# Patient Record
Sex: MALE | Race: WHITE
[De-identification: names, ages, dates, MRNs, and addresses within clinical notes are randomized per-mention and may not be internally consistent; named-entity substitution may affect disease eponyms.]

---

## 2017-02-02 ENCOUNTER — HOSPITAL ENCOUNTER (OUTPATIENT)
Dept: HOSPITAL 71 - ER | Age: 57
Setting detail: OBSERVATION
LOS: 1 days | Discharge: HOME | End: 2017-02-03
Attending: INTERNAL MEDICINE | Admitting: INTERNAL MEDICINE
Payer: COMMERCIAL

## 2017-02-02 VITALS — RESPIRATION RATE: 20 BRPM | TEMPERATURE: 98.4 F

## 2017-02-02 VITALS
BODY MASS INDEX: 37.49 KG/M2 | WEIGHT: 238.9 LBS | HEIGHT: 67 IN | WEIGHT: 238.9 LBS | HEIGHT: 67 IN | BODY MASS INDEX: 37.49 KG/M2

## 2017-02-02 VITALS — TEMPERATURE: 98.4 F | SYSTOLIC BLOOD PRESSURE: 109 MMHG

## 2017-02-02 VITALS — SYSTOLIC BLOOD PRESSURE: 130 MMHG

## 2017-02-02 VITALS — RESPIRATION RATE: 20 BRPM

## 2017-02-02 DIAGNOSIS — Z79.899: ICD-10-CM

## 2017-02-02 DIAGNOSIS — E83.42: ICD-10-CM

## 2017-02-02 DIAGNOSIS — E86.0: ICD-10-CM

## 2017-02-02 DIAGNOSIS — R07.89: ICD-10-CM

## 2017-02-02 DIAGNOSIS — G47.33: ICD-10-CM

## 2017-02-02 DIAGNOSIS — I10: ICD-10-CM

## 2017-02-02 DIAGNOSIS — R91.1: ICD-10-CM

## 2017-02-02 DIAGNOSIS — K76.0: ICD-10-CM

## 2017-02-02 DIAGNOSIS — Z79.82: ICD-10-CM

## 2017-02-02 DIAGNOSIS — K57.90: ICD-10-CM

## 2017-02-02 DIAGNOSIS — A08.4: Primary | ICD-10-CM

## 2017-02-02 PROCEDURE — 80048 BASIC METABOLIC PNL TOTAL CA: CPT

## 2017-02-02 PROCEDURE — 99217: CPT

## 2017-02-02 PROCEDURE — 94799 UNLISTED PULMONARY SVC/PX: CPT

## 2017-02-02 PROCEDURE — 96361 HYDRATE IV INFUSION ADD-ON: CPT

## 2017-02-02 PROCEDURE — 85730 THROMBOPLASTIN TIME PARTIAL: CPT

## 2017-02-02 PROCEDURE — 96366 THER/PROPH/DIAG IV INF ADDON: CPT

## 2017-02-02 PROCEDURE — 96374 THER/PROPH/DIAG INJ IV PUSH: CPT

## 2017-02-02 PROCEDURE — 83690 ASSAY OF LIPASE: CPT

## 2017-02-02 PROCEDURE — 82550 ASSAY OF CK (CPK): CPT

## 2017-02-02 PROCEDURE — 96365 THER/PROPH/DIAG IV INF INIT: CPT

## 2017-02-02 PROCEDURE — 99219: CPT

## 2017-02-02 PROCEDURE — 84484 ASSAY OF TROPONIN QUANT: CPT

## 2017-02-02 PROCEDURE — 74177 CT ABD & PELVIS W/CONTRAST: CPT

## 2017-02-02 PROCEDURE — 93306 TTE W/DOPPLER COMPLETE: CPT

## 2017-02-02 PROCEDURE — 85610 PROTHROMBIN TIME: CPT

## 2017-02-02 PROCEDURE — 85025 COMPLETE CBC W/AUTO DIFF WBC: CPT

## 2017-02-02 PROCEDURE — 71010: CPT

## 2017-02-02 PROCEDURE — 83605 ASSAY OF LACTIC ACID: CPT

## 2017-02-02 PROCEDURE — 87804 INFLUENZA ASSAY W/OPTIC: CPT

## 2017-02-02 PROCEDURE — 36415 COLL VENOUS BLD VENIPUNCTURE: CPT

## 2017-02-02 PROCEDURE — 93005 ELECTROCARDIOGRAM TRACING: CPT

## 2017-02-02 PROCEDURE — 96376 TX/PRO/DX INJ SAME DRUG ADON: CPT

## 2017-02-02 PROCEDURE — 81003 URINALYSIS AUTO W/O SCOPE: CPT

## 2017-02-02 PROCEDURE — 80053 COMPREHEN METABOLIC PANEL: CPT

## 2017-02-02 PROCEDURE — 96375 TX/PRO/DX INJ NEW DRUG ADDON: CPT

## 2017-02-02 PROCEDURE — 83735 ASSAY OF MAGNESIUM: CPT

## 2017-02-02 RX ADMIN — MAGNESIUM SULFATE IN DEXTROSE SCH MLS/HR: 10 INJECTION, SOLUTION INTRAVENOUS at 22:49

## 2017-02-02 RX ADMIN — Medication SCH ML: at 21:30

## 2017-02-02 RX ADMIN — MAGNESIUM SULFATE IN DEXTROSE SCH MLS/HR: 10 INJECTION, SOLUTION INTRAVENOUS at 21:29

## 2017-02-02 RX ADMIN — SODIUM CHLORIDE SCH MLS/HR: 9 INJECTION, SOLUTION INTRAVENOUS at 21:29

## 2017-02-03 VITALS — RESPIRATION RATE: 20 BRPM | TEMPERATURE: 99.4 F | SYSTOLIC BLOOD PRESSURE: 131 MMHG

## 2017-02-03 VITALS — TEMPERATURE: 98.2 F | SYSTOLIC BLOOD PRESSURE: 133 MMHG | RESPIRATION RATE: 16 BRPM

## 2017-02-03 VITALS — RESPIRATION RATE: 20 BRPM | TEMPERATURE: 99.1 F | SYSTOLIC BLOOD PRESSURE: 131 MMHG

## 2017-02-03 VITALS — SYSTOLIC BLOOD PRESSURE: 131 MMHG | TEMPERATURE: 99.1 F | RESPIRATION RATE: 20 BRPM

## 2017-02-03 VITALS — TEMPERATURE: 98.8 F | SYSTOLIC BLOOD PRESSURE: 136 MMHG | RESPIRATION RATE: 20 BRPM

## 2017-02-03 RX ADMIN — MAGNESIUM SULFATE IN DEXTROSE SCH MLS/HR: 10 INJECTION, SOLUTION INTRAVENOUS at 00:21

## 2017-02-03 RX ADMIN — Medication SCH ML: at 07:40

## 2017-02-03 RX ADMIN — MAGNESIUM SULFATE IN DEXTROSE SCH MLS/HR: 10 INJECTION, SOLUTION INTRAVENOUS at 02:04

## 2017-02-03 RX ADMIN — SODIUM CHLORIDE SCH MLS/HR: 9 INJECTION, SOLUTION INTRAVENOUS at 11:22

## 2018-07-18 ENCOUNTER — CONVERSION ENCOUNTER (OUTPATIENT)
Dept: SURGERY | Facility: CLINIC | Age: 58
End: 2018-07-18

## 2018-07-18 ENCOUNTER — OFFICE VISIT CONVERTED (OUTPATIENT)
Dept: UROLOGY | Facility: CLINIC | Age: 58
End: 2018-07-18
Attending: UROLOGY

## 2018-08-02 ENCOUNTER — OFFICE VISIT (OUTPATIENT)
Dept: FAMILY MEDICINE CLINIC | Facility: CLINIC | Age: 58
End: 2018-08-02

## 2018-08-02 VITALS
BODY MASS INDEX: 34.53 KG/M2 | RESPIRATION RATE: 16 BRPM | TEMPERATURE: 98.3 F | WEIGHT: 220 LBS | HEIGHT: 67 IN | HEART RATE: 75 BPM | SYSTOLIC BLOOD PRESSURE: 150 MMHG | DIASTOLIC BLOOD PRESSURE: 78 MMHG | OXYGEN SATURATION: 96 %

## 2018-08-02 DIAGNOSIS — K57.10 DIVERTICULOSIS OF SMALL INTESTINE WITHOUT HEMORRHAGE: ICD-10-CM

## 2018-08-02 DIAGNOSIS — Z00.00 PHYSICAL EXAM, ANNUAL: Primary | ICD-10-CM

## 2018-08-02 DIAGNOSIS — I10 ESSENTIAL HYPERTENSION: ICD-10-CM

## 2018-08-02 DIAGNOSIS — E11.9 TYPE 2 DIABETES MELLITUS WITHOUT COMPLICATION, WITHOUT LONG-TERM CURRENT USE OF INSULIN (HCC): ICD-10-CM

## 2018-08-02 DIAGNOSIS — K76.0 FATTY LIVER: ICD-10-CM

## 2018-08-02 PROCEDURE — 99386 PREV VISIT NEW AGE 40-64: CPT | Performed by: INTERNAL MEDICINE

## 2018-08-02 RX ORDER — AMLODIPINE BESYLATE 10 MG/1
10 TABLET ORAL DAILY
Qty: 30 TABLET | Refills: 3 | Status: SHIPPED | OUTPATIENT
Start: 2018-08-02 | End: 2018-12-21 | Stop reason: SDUPTHER

## 2018-08-02 RX ORDER — HYDROCHLOROTHIAZIDE 25 MG/1
25 TABLET ORAL DAILY
COMMUNITY
End: 2018-08-02 | Stop reason: ALTCHOICE

## 2018-08-02 RX ORDER — LISINOPRIL 20 MG/1
20 TABLET ORAL DAILY
Qty: 30 TABLET | Refills: 3 | Status: SHIPPED | OUTPATIENT
Start: 2018-08-02 | End: 2018-12-21 | Stop reason: SDUPTHER

## 2018-08-02 RX ORDER — LISINOPRIL 5 MG/1
5 TABLET ORAL
COMMUNITY
End: 2018-08-02 | Stop reason: DRUGHIGH

## 2018-08-02 RX ORDER — ASPIRIN 81 MG/1
81 TABLET ORAL DAILY
COMMUNITY
End: 2022-08-12

## 2018-08-02 RX ORDER — ATORVASTATIN CALCIUM 10 MG/1
10 TABLET, FILM COATED ORAL DAILY
COMMUNITY
End: 2018-12-21 | Stop reason: DRUGHIGH

## 2018-08-02 NOTE — PATIENT INSTRUCTIONS
Diabetes Mellitus and Nutrition  When you have diabetes (diabetes mellitus), it is very important to have healthy eating habits because your blood sugar (glucose) levels are greatly affected by what you eat and drink. Eating healthy foods in the appropriate amounts, at about the same times every day, can help you:  · Control your blood glucose.  · Lower your risk of heart disease.  · Improve your blood pressure.  · Reach or maintain a healthy weight.    Every person with diabetes is different, and each person has different needs for a meal plan. Your health care provider may recommend that you work with a diet and nutrition specialist (dietitian) to make a meal plan that is best for you. Your meal plan may vary depending on factors such as:  · The calories you need.  · The medicines you take.  · Your weight.  · Your blood glucose, blood pressure, and cholesterol levels.  · Your activity level.  · Other health conditions you have, such as heart or kidney disease.    How do carbohydrates affect me?  Carbohydrates affect your blood glucose level more than any other type of food. Eating carbohydrates naturally increases the amount of glucose in your blood. Carbohydrate counting is a method for keeping track of how many carbohydrates you eat. Counting carbohydrates is important to keep your blood glucose at a healthy level, especially if you use insulin or take certain oral diabetes medicines.  It is important to know how many carbohydrates you can safely have in each meal. This is different for every person. Your dietitian can help you calculate how many carbohydrates you should have at each meal and for snack.  Foods that contain carbohydrates include:  · Bread, cereal, rice, pasta, and crackers.  · Potatoes and corn.  · Peas, beans, and lentils.  · Milk and yogurt.  · Fruit and juice.  · Desserts, such as cakes, cookies, ice cream, and candy.    How does alcohol affect me?  Alcohol can cause a sudden decrease in blood  "glucose (hypoglycemia), especially if you use insulin or take certain oral diabetes medicines. Hypoglycemia can be a life-threatening condition. Symptoms of hypoglycemia (sleepiness, dizziness, and confusion) are similar to symptoms of having too much alcohol.  If your health care provider says that alcohol is safe for you, follow these guidelines:  · Limit alcohol intake to no more than 1 drink per day for nonpregnant women and 2 drinks per day for men. One drink equals 12 oz of beer, 5 oz of wine, or 1½ oz of hard liquor.  · Do not drink on an empty stomach.  · Keep yourself hydrated with water, diet soda, or unsweetened iced tea.  · Keep in mind that regular soda, juice, and other mixers may contain a lot of sugar and must be counted as carbohydrates.    What are tips for following this plan?  Reading food labels  · Start by checking the serving size on the label. The amount of calories, carbohydrates, fats, and other nutrients listed on the label are based on one serving of the food. Many foods contain more than one serving per package.  · Check the total grams (g) of carbohydrates in one serving. You can calculate the number of servings of carbohydrates in one serving by dividing the total carbohydrates by 15. For example, if a food has 30 g of total carbohydrates, it would be equal to 2 servings of carbohydrates.  · Check the number of grams (g) of saturated and trans fats in one serving. Choose foods that have low or no amount of these fats.  · Check the number of milligrams (mg) of sodium in one serving. Most people should limit total sodium intake to less than 2,300 mg per day.  · Always check the nutrition information of foods labeled as \"low-fat\" or \"nonfat\". These foods may be higher in added sugar or refined carbohydrates and should be avoided.  · Talk to your dietitian to identify your daily goals for nutrients listed on the label.  Shopping  · Avoid buying canned, premade, or processed foods. These " foods tend to be high in fat, sodium, and added sugar.  · Shop around the outside edge of the grocery store. This includes fresh fruits and vegetables, bulk grains, fresh meats, and fresh dairy.  Cooking  · Use low-heat cooking methods, such as baking, instead of high-heat cooking methods like deep frying.  · Cook using healthy oils, such as olive, canola, or sunflower oil.  · Avoid cooking with butter, cream, or high-fat meats.  Meal planning  · Eat meals and snacks regularly, preferably at the same times every day. Avoid going long periods of time without eating.  · Eat foods high in fiber, such as fresh fruits, vegetables, beans, and whole grains. Talk to your dietitian about how many servings of carbohydrates you can eat at each meal.  · Eat 4-6 ounces of lean protein each day, such as lean meat, chicken, fish, eggs, or tofu. 1 ounce is equal to 1 ounce of meat, chicken, or fish, 1 egg, or 1/4 cup of tofu.  · Eat some foods each day that contain healthy fats, such as avocado, nuts, seeds, and fish.  Lifestyle    · Check your blood glucose regularly.  · Exercise at least 30 minutes 5 or more days each week, or as told by your health care provider.  · Take medicines as told by your health care provider.  · Do not use any products that contain nicotine or tobacco, such as cigarettes and e-cigarettes. If you need help quitting, ask your health care provider.  · Work with a counselor or diabetes educator to identify strategies to manage stress and any emotional and social challenges.  What are some questions to ask my health care provider?  · Do I need to meet with a diabetes educator?  · Do I need to meet with a dietitian?  · What number can I call if I have questions?  · When are the best times to check my blood glucose?  Where to find more information:  · American Diabetes Association: diabetes.org/food-and-fitness/food  · Academy of Nutrition and Dietetics:  www.eatright.org/resources/health/diseases-and-conditions/diabetes  · National [place] of Diabetes and Digestive and Kidney Diseases (NIH): www.niddk.nih.gov/health-information/diabetes/overview/diet-eating-physical-activity  Summary  · A healthy meal plan will help you control your blood glucose and maintain a healthy lifestyle.  · Working with a diet and nutrition specialist (dietitian) can help you make a meal plan that is best for you.  · Keep in mind that carbohydrates and alcohol have immediate effects on your blood glucose levels. It is important to count carbohydrates and to use alcohol carefully.  This information is not intended to replace advice given to you by your health care provider. Make sure you discuss any questions you have with your health care provider.  Document Released: 09/14/2006 Document Revised: 01/22/2018 Document Reviewed: 01/22/2018  [employer] Interactive Patient Education © 2018 [employer]

## 2018-08-02 NOTE — PROGRESS NOTES
Subjective   Forest Freeman is a 57 y.o. male.     History of Present Illness   Patient being seen for a physical.  Her has a history of diabetes and will record his blood sugar and blood pressure return to our clinic in 2 weeks.  He does have a fatty liver and was given vitamin E 200 units daily.  His diverticulosis is been well-controlled with past several months.  Patient's diet and exercise level was discussed and he was given a DASH diet.    Dictated utilizing Dragon dictation. If there are questions or for further clarification, please contact me.   The following portions of the patient's history were reviewed and updated as appropriate: allergies, current medications, past family history, past medical history, past social history, past surgical history and problem list.    Review of Systems   Constitutional: Negative for fatigue and fever.   HENT: Positive for congestion. Negative for trouble swallowing.    Eyes: Negative for discharge and visual disturbance.   Respiratory: Negative for choking and shortness of breath.    Cardiovascular: Negative for chest pain and palpitations.   Gastrointestinal: Negative for abdominal pain and blood in stool.   Endocrine: Negative.    Genitourinary: Negative for genital sores and hematuria.   Musculoskeletal: Negative for gait problem and joint swelling.   Skin: Negative for color change, pallor, rash and wound.   Allergic/Immunologic: Positive for environmental allergies. Negative for immunocompromised state.   Neurological: Negative for facial asymmetry and speech difficulty.   Psychiatric/Behavioral: Negative for hallucinations and suicidal ideas.       Objective   Physical Exam   Constitutional: He is oriented to person, place, and time. He appears well-developed and well-nourished. No distress.   HENT:   Head: Normocephalic and atraumatic.   Right Ear: External ear normal.   Left Ear: External ear normal.   Nose: Nose normal.   Mouth/Throat: Oropharynx is clear and  moist. No oropharyngeal exudate.   Eyes: Pupils are equal, round, and reactive to light. Conjunctivae and EOM are normal. Right eye exhibits no discharge. Left eye exhibits no discharge. No scleral icterus.   Neck: Normal range of motion. Neck supple. No JVD present. No tracheal deviation present. No thyromegaly present.   Cardiovascular: Normal rate, regular rhythm and normal heart sounds.  Exam reveals no gallop and no friction rub.    No murmur heard.  Pulmonary/Chest: Effort normal and breath sounds normal. No stridor. No respiratory distress. He has no wheezes. He has no rales. He exhibits no tenderness.   Abdominal: Soft. Bowel sounds are normal. He exhibits no distension and no mass. There is no tenderness. There is no rebound and no guarding. No hernia.   Musculoskeletal: Normal range of motion. He exhibits no edema, tenderness or deformity.   Lymphadenopathy:     He has no cervical adenopathy.   Neurological: He is alert and oriented to person, place, and time. He displays normal reflexes. No cranial nerve deficit or sensory deficit. He exhibits normal muscle tone. Coordination normal.   Skin: Skin is warm and dry. No rash noted. He is not diaphoretic. No erythema. No pallor.   Psychiatric: He has a normal mood and affect. His behavior is normal. Judgment and thought content normal.   Nursing note and vitals reviewed.      Assessment/Plan   Problems Addressed this Visit        Cardiovascular and Mediastinum    Hypertension    Relevant Medications    lisinopril (PRINIVIL,ZESTRIL) 20 MG tablet    amLODIPine (NORVASC) 10 MG tablet    Other Relevant Orders    CBC & Differential    Comprehensive Metabolic Panel    Hemoglobin A1c    Lipid Panel    Vitamin D 25 Hydroxy    PSA Screen       Digestive    Diverticulosis    Relevant Orders    CBC & Differential    Comprehensive Metabolic Panel    Hemoglobin A1c    Lipid Panel    Vitamin D 25 Hydroxy    PSA Screen    Fatty liver    Relevant Orders    CBC & Differential     Comprehensive Metabolic Panel    Hemoglobin A1c    Lipid Panel    Vitamin D 25 Hydroxy    PSA Screen       Endocrine    Type 2 diabetes mellitus without complication (CMS/HCC)    Relevant Medications    metFORMIN (GLUCOPHAGE) 500 MG tablet    Other Relevant Orders    CBC & Differential    Comprehensive Metabolic Panel    Hemoglobin A1c    Lipid Panel    Vitamin D 25 Hydroxy    PSA Screen      Other Visit Diagnoses     Physical exam, annual    -  Primary

## 2018-12-21 ENCOUNTER — LAB (OUTPATIENT)
Dept: FAMILY MEDICINE CLINIC | Facility: CLINIC | Age: 58
End: 2018-12-21

## 2018-12-21 ENCOUNTER — TELEPHONE (OUTPATIENT)
Dept: FAMILY MEDICINE CLINIC | Facility: CLINIC | Age: 58
End: 2018-12-21

## 2018-12-21 DIAGNOSIS — I10 ESSENTIAL HYPERTENSION: ICD-10-CM

## 2018-12-21 DIAGNOSIS — E11.9 TYPE 2 DIABETES MELLITUS WITHOUT COMPLICATION, WITHOUT LONG-TERM CURRENT USE OF INSULIN (HCC): ICD-10-CM

## 2018-12-21 DIAGNOSIS — E78.2 MIXED HYPERLIPIDEMIA: Primary | ICD-10-CM

## 2018-12-21 DIAGNOSIS — K76.0 FATTY LIVER: ICD-10-CM

## 2018-12-21 DIAGNOSIS — K57.10 DIVERTICULOSIS OF SMALL INTESTINE WITHOUT HEMORRHAGE: ICD-10-CM

## 2018-12-21 LAB
25(OH)D3 SERPL-MCNC: 28.5 NG/ML (ref 30–100)
ALBUMIN SERPL-MCNC: 4.6 G/DL (ref 3.5–5.2)
ALBUMIN/GLOB SERPL: 1.5 G/DL
ALP SERPL-CCNC: 95 U/L (ref 39–117)
ALT SERPL W P-5'-P-CCNC: 16 U/L (ref 1–41)
ANION GAP SERPL CALCULATED.3IONS-SCNC: 11.8 MMOL/L
AST SERPL-CCNC: 14 U/L (ref 1–40)
BILIRUB SERPL-MCNC: 0.8 MG/DL (ref 0.1–1.2)
BUN BLD-MCNC: 15 MG/DL (ref 6–20)
BUN/CREAT SERPL: 12.9 (ref 7–25)
CALCIUM SPEC-SCNC: 9.8 MG/DL (ref 8.6–10.5)
CHLORIDE SERPL-SCNC: 101 MMOL/L (ref 98–107)
CHOLEST SERPL-MCNC: 210 MG/DL (ref 0–200)
CO2 SERPL-SCNC: 27.2 MMOL/L (ref 22–29)
CREAT BLD-MCNC: 1.16 MG/DL (ref 0.76–1.27)
ERYTHROCYTE [DISTWIDTH] IN BLOOD BY AUTOMATED COUNT: 13 % (ref 4.5–15)
GFR SERPL CREATININE-BSD FRML MDRD: 65 ML/MIN/1.73
GLOBULIN UR ELPH-MCNC: 3.1 GM/DL
GLUCOSE BLD-MCNC: 138 MG/DL (ref 65–99)
HBA1C MFR BLD: 5.82 % (ref 4.8–5.6)
HCT VFR BLD AUTO: 49.8 % (ref 35–60)
HDLC SERPL-MCNC: 37 MG/DL (ref 40–60)
HGB BLD-MCNC: 16.5 G/DL (ref 13.5–18)
LDLC SERPL CALC-MCNC: 151 MG/DL (ref 0–100)
LDLC/HDLC SERPL: 4.09 {RATIO}
LYMPHOCYTES # BLD AUTO: 2.3 10*3/MM3 (ref 1.2–3.4)
LYMPHOCYTES NFR BLD AUTO: 20.8 % (ref 21–51)
MCH RBC QN AUTO: 30.1 PG (ref 26.1–33.1)
MCHC RBC AUTO-ENTMCNC: 33.1 G/DL (ref 33–37)
MCV RBC AUTO: 90.9 FL (ref 80–99)
MONOCYTES # BLD AUTO: 0.7 10*3/MM3 (ref 0.1–0.6)
MONOCYTES NFR BLD AUTO: 6.3 % (ref 2–9)
NEUTROPHILS # BLD AUTO: 8 10*3/MM3 (ref 1.4–6.5)
NEUTROPHILS NFR BLD AUTO: 72.9 % (ref 42–75)
PLATELET # BLD AUTO: 309 10*3/MM3 (ref 150–450)
PMV BLD AUTO: 7.7 FL (ref 7.1–10.5)
POTASSIUM BLD-SCNC: 4.6 MMOL/L (ref 3.5–5.2)
PROT SERPL-MCNC: 7.7 G/DL (ref 6–8.5)
PSA SERPL-MCNC: 0.34 NG/ML (ref 0–4)
RBC # BLD AUTO: 5.48 10*6/MM3 (ref 4–6)
SODIUM BLD-SCNC: 140 MMOL/L (ref 136–145)
TRIGL SERPL-MCNC: 109 MG/DL (ref 0–150)
VLDLC SERPL-MCNC: 21.8 MG/DL (ref 5–40)
WBC NRBC COR # BLD: 11 10*3/MM3 (ref 4.5–10)

## 2018-12-21 PROCEDURE — 80053 COMPREHEN METABOLIC PANEL: CPT | Performed by: INTERNAL MEDICINE

## 2018-12-21 PROCEDURE — 85025 COMPLETE CBC W/AUTO DIFF WBC: CPT | Performed by: INTERNAL MEDICINE

## 2018-12-21 PROCEDURE — 82306 VITAMIN D 25 HYDROXY: CPT | Performed by: INTERNAL MEDICINE

## 2018-12-21 PROCEDURE — 36415 COLL VENOUS BLD VENIPUNCTURE: CPT | Performed by: INTERNAL MEDICINE

## 2018-12-21 PROCEDURE — 83036 HEMOGLOBIN GLYCOSYLATED A1C: CPT | Performed by: INTERNAL MEDICINE

## 2018-12-21 PROCEDURE — 80061 LIPID PANEL: CPT | Performed by: INTERNAL MEDICINE

## 2018-12-21 PROCEDURE — G0103 PSA SCREENING: HCPCS | Performed by: INTERNAL MEDICINE

## 2018-12-21 RX ORDER — LISINOPRIL 20 MG/1
20 TABLET ORAL DAILY
Qty: 30 TABLET | Refills: 0 | Status: SHIPPED | OUTPATIENT
Start: 2018-12-21 | End: 2018-12-21 | Stop reason: SDUPTHER

## 2018-12-21 RX ORDER — AMLODIPINE BESYLATE 10 MG/1
10 TABLET ORAL DAILY
Qty: 30 TABLET | Refills: 3 | Status: SHIPPED | OUTPATIENT
Start: 2018-12-21 | End: 2018-12-21 | Stop reason: SDUPTHER

## 2018-12-21 RX ORDER — LISINOPRIL 20 MG/1
20 TABLET ORAL DAILY
Qty: 90 TABLET | Refills: 0 | Status: SHIPPED | OUTPATIENT
Start: 2018-12-21 | End: 2018-12-21 | Stop reason: SDUPTHER

## 2018-12-21 RX ORDER — LISINOPRIL 20 MG/1
20 TABLET ORAL DAILY
Qty: 90 TABLET | Refills: 0 | Status: SHIPPED | OUTPATIENT
Start: 2018-12-21 | End: 2019-01-02 | Stop reason: DRUGHIGH

## 2018-12-21 RX ORDER — AMLODIPINE BESYLATE 10 MG/1
10 TABLET ORAL DAILY
Qty: 90 TABLET | Refills: 0 | Status: SHIPPED | OUTPATIENT
Start: 2018-12-21

## 2018-12-21 RX ORDER — ATORVASTATIN CALCIUM 40 MG/1
40 TABLET, FILM COATED ORAL DAILY
Qty: 30 TABLET | Refills: 3 | Status: SHIPPED | OUTPATIENT
Start: 2018-12-21

## 2018-12-21 NOTE — TELEPHONE ENCOUNTER
The 2 rx we sent in to [facility] today on [facility] will not cover meds unless it is for 90 days can we call and fix that

## 2018-12-21 NOTE — TELEPHONE ENCOUNTER
Pt came by he needs his b/p meds ..[phone] he has made a appt for glenroy 3,2019 ..He sts that he needs his meds...He also did blood work today.....

## 2019-01-02 ENCOUNTER — OFFICE VISIT (OUTPATIENT)
Dept: FAMILY MEDICINE CLINIC | Facility: CLINIC | Age: 59
End: 2019-01-02

## 2019-01-02 VITALS
RESPIRATION RATE: 15 BRPM | OXYGEN SATURATION: 98 % | BODY MASS INDEX: 33.74 KG/M2 | SYSTOLIC BLOOD PRESSURE: 138 MMHG | HEIGHT: 67 IN | TEMPERATURE: 98.1 F | WEIGHT: 215 LBS | HEART RATE: 72 BPM | DIASTOLIC BLOOD PRESSURE: 84 MMHG

## 2019-01-02 DIAGNOSIS — E78.2 MIXED HYPERLIPIDEMIA: ICD-10-CM

## 2019-01-02 DIAGNOSIS — L02.91 ABSCESS: ICD-10-CM

## 2019-01-02 DIAGNOSIS — I10 ESSENTIAL HYPERTENSION: ICD-10-CM

## 2019-01-02 DIAGNOSIS — E11.9 TYPE 2 DIABETES MELLITUS WITHOUT COMPLICATION, WITHOUT LONG-TERM CURRENT USE OF INSULIN (HCC): Primary | ICD-10-CM

## 2019-01-02 PROCEDURE — 99214 OFFICE O/P EST MOD 30 MIN: CPT | Performed by: INTERNAL MEDICINE

## 2019-01-02 PROCEDURE — 90471 IMMUNIZATION ADMIN: CPT | Performed by: INTERNAL MEDICINE

## 2019-01-02 PROCEDURE — 90732 PPSV23 VACC 2 YRS+ SUBQ/IM: CPT | Performed by: INTERNAL MEDICINE

## 2019-01-02 PROCEDURE — 90674 CCIIV4 VAC NO PRSV 0.5 ML IM: CPT | Performed by: INTERNAL MEDICINE

## 2019-01-02 PROCEDURE — 90472 IMMUNIZATION ADMIN EACH ADD: CPT | Performed by: INTERNAL MEDICINE

## 2019-01-02 RX ORDER — AMOXICILLIN AND CLAVULANATE POTASSIUM 875; 125 MG/1; MG/1
1 TABLET, FILM COATED ORAL 2 TIMES DAILY
Qty: 20 TABLET | Refills: 0 | Status: SHIPPED | OUTPATIENT
Start: 2019-01-02 | End: 2022-08-12

## 2019-01-02 RX ORDER — LISINOPRIL 40 MG/1
40 TABLET ORAL DAILY
Qty: 90 TABLET | Refills: 1 | Status: SHIPPED | OUTPATIENT
Start: 2019-01-02

## 2019-01-02 NOTE — PROGRESS NOTES
Subjective   Forest Freeman is a 58 y.o. male.     History of Present Illness   She was seen for diabetes.  Blood sugars been running in the 130s.  Blood pressures been 138 over 80s.  He did have an abscess on his eyelids referred to neurology but does not have patient coverage and is concerned over the health insurance will pay for  it or not it is on his upper eyelid is been there several months.  He was given Augmentin and asked to follow-up with the ophthalmologist.    Dictated utilizing Dragon dictation. If there are questions or for further clarification, please contact me.   The following portions of the patient's history were reviewed and updated as appropriate: allergies, current medications, past family history, past medical history, past social history, past surgical history and problem list.    Review of Systems   Constitutional: Negative for fatigue and fever.   HENT: Positive for congestion. Negative for trouble swallowing.    Eyes: Negative for discharge and visual disturbance.   Respiratory: Negative for choking and shortness of breath.    Cardiovascular: Negative for chest pain and palpitations.   Gastrointestinal: Negative for abdominal pain and blood in stool.   Endocrine: Negative.    Genitourinary: Negative for genital sores and hematuria.   Musculoskeletal: Negative for gait problem and joint swelling.   Skin: Negative for color change, pallor, rash and wound.        Abscess right eyelid   Allergic/Immunologic: Positive for environmental allergies. Negative for immunocompromised state.   Neurological: Negative for facial asymmetry and speech difficulty.   Psychiatric/Behavioral: Negative for hallucinations and suicidal ideas.       Objective   Physical Exam   Constitutional: He is oriented to person, place, and time. He appears well-developed and well-nourished.   HENT:   Head: Normocephalic.   Eyes: Conjunctivae are normal. Pupils are equal, round, and reactive to light.   Neck: Normal range of  motion. Neck supple.   Cardiovascular: Normal rate, regular rhythm and normal heart sounds.   Pulmonary/Chest: Effort normal and breath sounds normal.   Abdominal: Soft. Bowel sounds are normal.   Musculoskeletal: Normal range of motion.   Neurological: He is alert and oriented to person, place, and time.   Skin: Skin is warm and dry.   Abscess right eyelid   Psychiatric: He has a normal mood and affect. His behavior is normal. Judgment and thought content normal.   Nursing note and vitals reviewed.      Assessment/Plan   Problems Addressed this Visit        Cardiovascular and Mediastinum    Hypertension    Relevant Medications    lisinopril (PRINIVIL,ZESTRIL) 40 MG tablet    Hyperlipidemia       Endocrine    Type 2 diabetes mellitus without complication (CMS/[facility]) - Primary      Other Visit Diagnoses     Abscess        Relevant Orders    Ambulatory Referral to Ophthalmology (Completed)

## 2019-01-02 NOTE — PATIENT INSTRUCTIONS
"DASH Eating Plan  DASH stands for \"Dietary Approaches to Stop Hypertension.\" The DASH eating plan is a healthy eating plan that has been shown to reduce high blood pressure (hypertension). It may also reduce your risk for type 2 diabetes, heart disease, and stroke. The DASH eating plan may also help with weight loss.  What are tips for following this plan?  General guidelines  · Avoid eating more than 2,300 mg (milligrams) of salt (sodium) a day. If you have hypertension, you may need to reduce your sodium intake to 1,500 mg a day.  · Limit alcohol intake to no more than 1 drink a day for nonpregnant women and 2 drinks a day for men. One drink equals 12 oz of beer, 5 oz of wine, or 1½ oz of hard liquor.  · Work with your health care provider to maintain a healthy body weight or to lose weight. Ask what an ideal weight is for you.  · Get at least 30 minutes of exercise that causes your heart to beat faster (aerobic exercise) most days of the week. Activities may include walking, swimming, or biking.  · Work with your health care provider or diet and nutrition specialist (dietitian) to adjust your eating plan to your individual calorie needs.  Reading food labels  · Check food labels for the amount of sodium per serving. Choose foods with less than 5 percent of the Daily Value of sodium. Generally, foods with less than 300 mg of sodium per serving fit into this eating plan.  · To find whole grains, look for the word \"whole\" as the first word in the ingredient list.  Shopping  · Buy products labeled as \"low-sodium\" or \"no salt added.\"  · Buy fresh foods. Avoid canned foods and premade or frozen meals.  Cooking  · Avoid adding salt when cooking. Use salt-free seasonings or herbs instead of table salt or sea salt. Check with your health care provider or pharmacist before using salt substitutes.  · Do not quinones foods. Cook foods using healthy methods such as baking, boiling, grilling, and broiling instead.  · Cook with " heart-healthy oils, such as olive, canola, soybean, or sunflower oil.  Meal planning    · Eat a balanced diet that includes:  ? 5 or more servings of fruits and vegetables each day. At each meal, try to fill half of your plate with fruits and vegetables.  ? Up to 6-8 servings of whole grains each day.  ? Less than 6 oz of lean meat, poultry, or fish each day. A 3-oz serving of meat is about the same size as a deck of cards. One egg equals 1 oz.  ? 2 servings of low-fat dairy each day.  ? A serving of nuts, seeds, or beans 5 times each week.  ? Heart-healthy fats. Healthy fats called Omega-3 fatty acids are found in foods such as flaxseeds and coldwater fish, like sardines, salmon, and mackerel.  · Limit how much you eat of the following:  ? Canned or prepackaged foods.  ? Food that is high in trans fat, such as fried foods.  ? Food that is high in saturated fat, such as fatty meat.  ? Sweets, desserts, sugary drinks, and other foods with added sugar.  ? Full-fat dairy products.  · Do not salt foods before eating.  · Try to eat at least 2 vegetarian meals each week.  · Eat more home-cooked food and less restaurant, buffet, and fast food.  · When eating at a restaurant, ask that your food be prepared with less salt or no salt, if possible.  What foods are recommended?  The items listed may not be a complete list. Talk with your dietitian about what dietary choices are best for you.  Grains  Whole-grain or whole-wheat bread. Whole-grain or whole-wheat pasta. Brown rice. Oatmeal. Quinoa. Bulgur. Whole-grain and low-sodium cereals. Leandra bread. Low-fat, low-sodium crackers. Whole-wheat flour tortillas.  Vegetables  Fresh or frozen vegetables (raw, steamed, roasted, or grilled). Low-sodium or reduced-sodium tomato and vegetable juice. Low-sodium or reduced-sodium tomato sauce and tomato paste. Low-sodium or reduced-sodium canned vegetables.  Fruits  All fresh, dried, or frozen fruit. Canned fruit in natural juice (without  added sugar).  Meat and other protein foods  Skinless chicken or turkey. Ground chicken or turkey. Pork with fat trimmed off. Fish and seafood. Egg whites. Dried beans, peas, or lentils. Unsalted nuts, nut butters, and seeds. Unsalted canned beans. Lean cuts of beef with fat trimmed off. Low-sodium, lean deli meat.  Dairy  Low-fat (1%) or fat-free (skim) milk. Fat-free, low-fat, or reduced-fat cheeses. Nonfat, low-sodium ricotta or cottage cheese. Low-fat or nonfat yogurt. Low-fat, low-sodium cheese.  Fats and oils  Soft margarine without trans fats. Vegetable oil. Low-fat, reduced-fat, or light mayonnaise and salad dressings (reduced-sodium). Canola, safflower, olive, soybean, and sunflower oils. Avocado.  Seasoning and other foods  Herbs. Spices. Seasoning mixes without salt. Unsalted popcorn and pretzels. Fat-free sweets.  What foods are not recommended?  The items listed may not be a complete list. Talk with your dietitian about what dietary choices are best for you.  Grains  Baked goods made with fat, such as croissants, muffins, or some breads. Dry pasta or rice meal packs.  Vegetables  Creamed or fried vegetables. Vegetables in a cheese sauce. Regular canned vegetables (not low-sodium or reduced-sodium). Regular canned tomato sauce and paste (not low-sodium or reduced-sodium). Regular tomato and vegetable juice (not low-sodium or reduced-sodium). Pickles. Olives.  Fruits  Canned fruit in a light or heavy syrup. Fried fruit. Fruit in cream or butter sauce.  Meat and other protein foods  Fatty cuts of meat. Ribs. Fried meat. Elizabeth. Sausage. Bologna and other processed lunch meats. Salami. Fatback. Hotdogs. Bratwurst. Salted nuts and seeds. Canned beans with added salt. Canned or smoked fish. Whole eggs or egg yolks. Chicken or turkey with skin.  Dairy  Whole or 2% milk, cream, and half-and-half. Whole or full-fat cream cheese. Whole-fat or sweetened yogurt. Full-fat cheese. Nondairy creamers. Whipped toppings.  Processed cheese and cheese spreads.  Fats and oils  Butter. Stick margarine. Lard. Shortening. Ghee. Elizabeth fat. Tropical oils, such as coconut, palm kernel, or palm oil.  Seasoning and other foods  Salted popcorn and pretzels. Onion salt, garlic salt, seasoned salt, table salt, and sea salt. Worcestershire sauce. Tartar sauce. Barbecue sauce. Teriyaki sauce. Soy sauce, including reduced-sodium. Steak sauce. Canned and packaged gravies. Fish sauce. Oyster sauce. Cocktail sauce. Horseradish that you find on the shelf. Ketchup. Mustard. Meat flavorings and tenderizers. Bouillon cubes. Hot sauce and Tabasco sauce. Premade or packaged marinades. Premade or packaged taco seasonings. Relishes. Regular salad dressings.  Where to find more information:  · National Heart, Lung, and Blood [place]: www.nhlbi.nih.gov  · American Heart Association: www.heart.org  Summary  · The DASH eating plan is a healthy eating plan that has been shown to reduce high blood pressure (hypertension). It may also reduce your risk for type 2 diabetes, heart disease, and stroke.  · With the DASH eating plan, you should limit salt (sodium) intake to 2,300 mg a day. If you have hypertension, you may need to reduce your sodium intake to 1,500 mg a day.  · When on the DASH eating plan, aim to eat more fresh fruits and vegetables, whole grains, lean proteins, low-fat dairy, and heart-healthy fats.  · Work with your health care provider or diet and nutrition specialist (dietitian) to adjust your eating plan to your individual calorie needs.  This information is not intended to replace advice given to you by your health care provider. Make sure you discuss any questions you have with your health care provider.  Document Released: 12/06/2012 Document Revised: 12/11/2017 Document Reviewed: 12/11/2017  [employer] Interactive Patient Education © 2018 [employer]

## 2019-03-18 RX ORDER — LISINOPRIL 40 MG/1
40 TABLET ORAL DAILY
Qty: 90 TABLET | Refills: 1 | Status: SHIPPED | OUTPATIENT
Start: 2019-03-18 | End: 2022-08-12

## 2019-03-18 RX ORDER — LISINOPRIL 20 MG/1
TABLET ORAL
Qty: 90 TABLET | Refills: 0 | OUTPATIENT
Start: 2019-03-18

## 2021-03-22 ENCOUNTER — BULK ORDERING (OUTPATIENT)
Dept: CASE MANAGEMENT | Facility: OTHER | Age: 61
End: 2021-03-22

## 2021-03-22 DIAGNOSIS — Z23 IMMUNIZATION DUE: ICD-10-CM

## 2021-05-16 VITALS — HEIGHT: 67 IN | BODY MASS INDEX: 36.26 KG/M2 | RESPIRATION RATE: 12 BRPM | WEIGHT: 231 LBS

## 2025-05-23 ENCOUNTER — APPOINTMENT (OUTPATIENT)
Dept: GENERAL RADIOLOGY | Facility: HOSPITAL | Age: 65
End: 2025-05-23
Payer: COMMERCIAL

## 2025-05-23 ENCOUNTER — APPOINTMENT (OUTPATIENT)
Dept: CT IMAGING | Facility: HOSPITAL | Age: 65
End: 2025-05-23
Payer: COMMERCIAL

## 2025-05-23 ENCOUNTER — HOSPITAL ENCOUNTER (EMERGENCY)
Facility: HOSPITAL | Age: 65
Discharge: HOME | End: 2025-05-23
Attending: EMERGENCY MEDICINE
Payer: COMMERCIAL

## 2025-05-23 VITALS
HEIGHT: 68 IN | RESPIRATION RATE: 18 BRPM | SYSTOLIC BLOOD PRESSURE: 150 MMHG | HEART RATE: 88 BPM | DIASTOLIC BLOOD PRESSURE: 101 MMHG | WEIGHT: 238.32 LBS | OXYGEN SATURATION: 97 % | BODY MASS INDEX: 36.12 KG/M2 | TEMPERATURE: 98.8 F

## 2025-05-23 DIAGNOSIS — S49.91XA INJURY OF RIGHT SHOULDER, INITIAL ENCOUNTER: Primary | ICD-10-CM
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
## 2025-05-23 PROCEDURE — 99284 EMERGENCY DEPT VISIT MOD MDM: CPT

## 2025-05-23 PROCEDURE — 25010000002 ORPHENADRINE CITRATE PER 60 MG

## 2025-05-23 PROCEDURE — 70450 CT HEAD/BRAIN W/O DYE: CPT

## 2025-05-23 PROCEDURE — 72125 CT NECK SPINE W/O DYE: CPT

## 2025-05-23 PROCEDURE — 25010000002 DEXAMETHASONE SODIUM PHOSPHATE 10 MG/ML SOLUTION

## 2025-05-23 PROCEDURE — 97110 THERAPEUTIC EXERCISES: CPT | Performed by: PHYSICAL THERAPIST

## 2025-05-23 PROCEDURE — 97161 PT EVAL LOW COMPLEX 20 MIN: CPT | Performed by: PHYSICAL THERAPIST

## 2025-05-23 PROCEDURE — 73030 X-RAY EXAM OF SHOULDER: CPT

## 2025-05-23 PROCEDURE — 96372 THER/PROPH/DIAG INJ SC/IM: CPT

## 2025-05-23 RX ORDER — ORPHENADRINE CITRATE 30 MG/ML
60 INJECTION INTRAMUSCULAR; INTRAVENOUS ONCE
Status: COMPLETED | OUTPATIENT
Start: 2025-05-23 | End: 2025-05-23

## 2025-05-23 RX ORDER — HYDROCODONE BITARTRATE AND ACETAMINOPHEN 7.5; 325 MG/1; MG/1
1 TABLET ORAL ONCE
Refills: 0 | Status: COMPLETED | OUTPATIENT
Start: 2025-05-23 | End: 2025-05-23

## 2025-05-23 RX ORDER — DEXAMETHASONE SODIUM PHOSPHATE 10 MG/ML
10 INJECTION, SOLUTION INTRAMUSCULAR; INTRAVENOUS ONCE
Status: COMPLETED | OUTPATIENT
Start: 2025-05-23 | End: 2025-05-23

## 2025-05-23 RX ORDER — KETOROLAC TROMETHAMINE 10 MG/1
10 TABLET, FILM COATED ORAL EVERY 6 HOURS PRN
Qty: 12 TABLET | Refills: 0 | Status: SHIPPED | OUTPATIENT
Start: 2025-05-23

## 2025-05-23 RX ORDER — LIDOCAINE 50 MG/G
1 PATCH TOPICAL EVERY 24 HOURS
Qty: 12 PATCH | Refills: 0 | Status: SHIPPED | OUTPATIENT
Start: 2025-05-23 | End: 2025-06-04

## 2025-05-23 RX ORDER — METHYLPREDNISOLONE 4 MG/1
TABLET ORAL
Qty: 21 TABLET | Refills: 0 | Status: SHIPPED | OUTPATIENT
Start: 2025-05-23

## 2025-05-23 RX ORDER — ORPHENADRINE CITRATE 100 MG/1
100 TABLET ORAL 2 TIMES DAILY
Qty: 12 TABLET | Refills: 0 | Status: SHIPPED | OUTPATIENT
Start: 2025-05-23 | End: 2025-05-29

## 2025-05-23 RX ADMIN — DEXAMETHASONE SODIUM PHOSPHATE 10 MG: 10 INJECTION INTRAMUSCULAR; INTRAVENOUS at 13:28

## 2025-05-23 RX ADMIN — HYDROCODONE BITARTRATE AND ACETAMINOPHEN 1 TABLET: 7.5; 325 TABLET ORAL at 13:27

## 2025-05-23 RX ADMIN — ORPHENADRINE CITRATE 60 MG: 60 INJECTION INTRAMUSCULAR; INTRAVENOUS at 13:27
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
## 2025-06-06 ENCOUNTER — OFFICE VISIT (OUTPATIENT)
Dept: ORTHOPEDIC SURGERY | Facility: CLINIC | Age: 65
End: 2025-06-06
Payer: COMMERCIAL

## 2025-06-06 VITALS
HEART RATE: 90 BPM | WEIGHT: 238 LBS | SYSTOLIC BLOOD PRESSURE: 131 MMHG | DIASTOLIC BLOOD PRESSURE: 89 MMHG | HEIGHT: 68 IN | OXYGEN SATURATION: 93 % | BODY MASS INDEX: 36.07 KG/M2

## 2025-06-06 DIAGNOSIS — S46.001A INJURY OF RIGHT ROTATOR CUFF, INITIAL ENCOUNTER: ICD-10-CM

## 2025-06-06 DIAGNOSIS — M25.511 RIGHT SHOULDER PAIN, UNSPECIFIED CHRONICITY: Primary | ICD-10-CM

## 2025-06-06 DIAGNOSIS — M19.011 PRIMARY OSTEOARTHRITIS OF RIGHT SHOULDER: ICD-10-CM
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
## 2025-07-02 ENCOUNTER — HOSPITAL ENCOUNTER (OUTPATIENT)
Dept: MRI IMAGING | Facility: HOSPITAL | Age: 65
Discharge: HOME | End: 2025-07-02
Admitting: ORTHOPAEDIC SURGERY
Payer: OTHER GOVERNMENT

## 2025-07-02 DIAGNOSIS — M25.511 RIGHT SHOULDER PAIN, UNSPECIFIED CHRONICITY: ICD-10-CM

## 2025-07-02 PROCEDURE — 73221 MRI JOINT UPR EXTREM W/O DYE: CPT

## 2025-07-23 ENCOUNTER — OFFICE VISIT (OUTPATIENT)
Age: 65
End: 2025-07-23
Payer: COMMERCIAL

## 2025-07-23 VITALS
OXYGEN SATURATION: 97 % | HEART RATE: 77 BPM | WEIGHT: 238 LBS | HEIGHT: 68 IN | SYSTOLIC BLOOD PRESSURE: 160 MMHG | BODY MASS INDEX: 36.07 KG/M2 | DIASTOLIC BLOOD PRESSURE: 98 MMHG

## 2025-07-23 DIAGNOSIS — M25.511 RIGHT SHOULDER PAIN, UNSPECIFIED CHRONICITY: Primary | ICD-10-CM

## 2025-07-23 DIAGNOSIS — M19.011 PRIMARY OSTEOARTHRITIS OF RIGHT SHOULDER: ICD-10-CM

## 2025-07-23 RX ORDER — PIOGLITAZONE 30 MG/1
30 TABLET ORAL EVERY MORNING
COMMUNITY

## 2025-07-23 RX ORDER — HYDROCODONE BITARTRATE AND ACETAMINOPHEN 5; 325 MG/1; MG/1
1 TABLET ORAL EVERY 6 HOURS PRN
COMMUNITY
Start: 2025-06-26

## 2025-07-23 RX ORDER — METFORMIN HYDROCHLORIDE EXTENDED-RELEASE TABLETS 1000 MG/1
1000 TABLET, FILM COATED, EXTENDED RELEASE ORAL 2 TIMES DAILY WITH MEALS
COMMUNITY
Start: 2025-06-27 | End: 2026-06-27

## 2025-07-23 RX ORDER — GLIMEPIRIDE 1 MG/1
TABLET ORAL
COMMUNITY
Start: 2025-06-27 | End: 2025-07-27
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
